# Patient Record
Sex: MALE | Race: WHITE | NOT HISPANIC OR LATINO | Employment: FULL TIME | URBAN - METROPOLITAN AREA
[De-identification: names, ages, dates, MRNs, and addresses within clinical notes are randomized per-mention and may not be internally consistent; named-entity substitution may affect disease eponyms.]

---

## 2019-11-11 ENCOUNTER — OFFICE VISIT (OUTPATIENT)
Dept: URGENT CARE | Facility: CLINIC | Age: 29
End: 2019-11-11
Payer: COMMERCIAL

## 2019-11-11 VITALS
BODY MASS INDEX: 31.1 KG/M2 | DIASTOLIC BLOOD PRESSURE: 74 MMHG | OXYGEN SATURATION: 97 % | SYSTOLIC BLOOD PRESSURE: 139 MMHG | WEIGHT: 210 LBS | HEART RATE: 84 BPM | RESPIRATION RATE: 14 BRPM | TEMPERATURE: 98.2 F | HEIGHT: 69 IN

## 2019-11-11 DIAGNOSIS — J06.9 UPPER RESPIRATORY INFECTION, VIRAL: Primary | ICD-10-CM

## 2019-11-11 PROCEDURE — G0382 LEV 3 HOSP TYPE B ED VISIT: HCPCS

## 2019-11-11 RX ORDER — ALBUTEROL SULFATE 90 UG/1
AEROSOL, METERED RESPIRATORY (INHALATION)
Refills: 11 | COMMUNITY
Start: 2019-10-11

## 2019-11-11 NOTE — LETTER
November 11, 2019     Patient: Fatoumata Martinez   YOB: 1990   Date of Visit: 11/11/2019       To Whom It May Concern: It is my medical opinion that Fatoumata Martinez may return to work on 11/13/2019  If you have any questions or concerns, please don't hesitate to call           Sincerely,        BE CAM HINES    CC: Fatoumata Mahajanon

## 2019-11-11 NOTE — PATIENT INSTRUCTIONS
Upper Respiratory Infection   AMBULATORY CARE:   An upper respiratory infection  is also called a common cold  It can affect your nose, throat, ears, and sinuses  Common signs and symptoms include the following:  Cold symptoms are usually worst for the first 3 to 5 days  You may have any of the following:  · Runny or stuffy nose    · Sneezing and coughing    · Sore throat or hoarseness    · Red, watery, and sore eyes    · Fatigue     · Chills and fever    · Headache, body aches, or sore muscles  Seek care immediately if:   · You have chest pain or trouble breathing  Contact your healthcare provider if:   · You have a fever over 102ºF (39°C)  · Your sore throat gets worse or you see white or yellow spots in your throat  · Your symptoms get worse after 3 to 5 days or your cold is not better in 14 days  · You have a rash anywhere on your skin  · You have large, tender lumps in your neck  · You have thick, green or yellow drainage from your nose  · You cough up thick yellow, green, or bloody mucus  · You have vomiting for more than 24 hours and cannot keep fluids down  · You have a bad earache  · You have questions or concerns about your condition or care  Treatment for a cold: There is no cure for the common cold  Colds are caused by viruses and do not get better with antibiotics  Most people get better in 7 to 14 days  You may continue to cough for 2 to 3 weeks  The following may help decrease your symptoms:  · Decongestants  help reduce nasal congestion and help you breathe more easily  If you take decongestant pills, they may make you feel restless or not able to sleep  Do not use decongestant sprays for more than a few days  · Cough suppressants  help reduce coughing  Ask your healthcare provider which type of cough medicine is best for you  · NSAIDs , such as ibuprofen, help decrease swelling, pain, and fever   NSAIDs can cause stomach bleeding or kidney problems in certain people  If you take blood thinner medicine, always ask your healthcare provider if NSAIDs are safe for you  Always read the medicine label and follow directions  · Acetaminophen  decreases pain and fever  It is available without a doctor's order  Ask how much to take and how often to take it  Follow directions  Read the labels of all other medicines you are using to see if they also contain acetaminophen, or ask your doctor or pharmacist  Acetaminophen can cause liver damage if not taken correctly  Do not use more than 4 grams (4,000 milligrams) total of acetaminophen in one day  Manage your cold:   · Rest as much as possible  Slowly start to do more each day  · Drink more liquids as directed  Liquids will help thin and loosen mucus so you can cough it up  Liquids will also help prevent dehydration  Liquids that help prevent dehydration include water, fruit juice, and broth  Do not drink liquids that contain caffeine  Caffeine can increase your risk for dehydration  Ask your healthcare provider how much liquid to drink each day  · Soothe a sore throat  Gargle with warm salt water  This helps your sore throat feel better  Make salt water by dissolving ¼ teaspoon salt in 1 cup warm water  You may also suck on hard candy or throat lozenges  You may use a sore throat spray  · Use a humidifier or vaporizer  Use a cool mist humidifier or a vaporizer to increase air moisture in your home  This may make it easier for you to breathe and help decrease your cough  · Use saline nasal drops as directed  These help relieve congestion  · Apply petroleum-based jelly around the outside of your nostrils  This can decrease irritation from blowing your nose  · Do not smoke  Nicotine and other chemicals in cigarettes and cigars can make your symptoms worse  They can also cause infections such as bronchitis or pneumonia   Ask your healthcare provider for information if you currently smoke and need help to quit  E-cigarettes or smokeless tobacco still contain nicotine  Talk to your healthcare provider before you use these products  Prevent spreading your cold to others:   · Try to stay away from other people during the first 2 to 3 days of your cold when it is more easily spread  · Do not share food or drinks  · Do not share hand towels with household members  · Wash your hands often, especially after you blow your nose  Turn away from other people and cover your mouth and nose with a tissue when you sneeze or cough  Follow up with your healthcare provider as directed:  Write down your questions so you remember to ask them during your visits  © 2017 [facility] Information is for End User's use only and may not be sold, redistributed or otherwise used for commercial purposes  All illustrations and images included in CareNotes® are the copyrighted property of [employer]  or Matthew Fischer  The above information is an  only  It is not intended as medical advice for individual conditions or treatments  Talk to your doctor, nurse or pharmacist before following any medical regimen to see if it is safe and effective for you

## 2020-01-09 ENCOUNTER — HOSPITAL ENCOUNTER (EMERGENCY)
Facility: HOSPITAL | Age: 30
Discharge: HOME | End: 2020-01-09
Attending: EMERGENCY MEDICINE | Admitting: EMERGENCY MEDICINE
Payer: COMMERCIAL

## 2020-01-09 VITALS
DIASTOLIC BLOOD PRESSURE: 72 MMHG | SYSTOLIC BLOOD PRESSURE: 127 MMHG | TEMPERATURE: 98 F | HEART RATE: 76 BPM | RESPIRATION RATE: 16 BRPM | OXYGEN SATURATION: 96 % | WEIGHT: 212.74 LBS | HEIGHT: 69 IN | BODY MASS INDEX: 31.51 KG/M2

## 2020-01-09 DIAGNOSIS — H57.11 ACUTE PAIN IN RIGHT EYE: Primary | ICD-10-CM

## 2020-01-09 PROCEDURE — 99282 EMERGENCY DEPT VISIT SF MDM: CPT | Performed by: EMERGENCY MEDICINE

## 2020-01-09 PROCEDURE — 99283 EMERGENCY DEPT VISIT LOW MDM: CPT

## 2020-01-09 NOTE — ED PROVIDER NOTES
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 Taking? albuterol (PROVENTIL HFA,VENTOLIN HFA) 90 mcg/act inhaler   Yes No   Sig: INHALE 2 PUFFS PO Q 4 H PRN      Facility-Administered Medications: None       Past Medical History:   Diagnosis Date    Asthma        Past Surgical History:   Procedure Laterality Date    HAND SURGERY      HEMORRHOID SURGERY      MANDIBLE SURGERY      SEPTOPLASTY      TONSILLECTOMY         History reviewed  No pertinent family history  I have reviewed and agree with the history as documented  Social History     Tobacco Use    Smoking status: Never Smoker    Smokeless tobacco: Never Used   Substance Use Topics    Alcohol use: Yes     Comment: social    Drug use: Never        Review of Systems   Constitutional: Negative for fever  Eyes: Positive for pain  Negative for photophobia, discharge, redness and itching  Respiratory: Negative for chest tightness and shortness of breath  Cardiovascular: Negative for chest pain  Skin: Negative for rash     Neurological: Negative for dizziness, light-headedness and headaches  Physical Exam  Physical Exam   Constitutional: He appears well-developed  HENT:   Head: Atraumatic  Eyes: Pupils are equal, round, and reactive to light  Conjunctivae and EOM are normal  Right eye exhibits no chemosis, no discharge, no exudate and no hordeolum  No foreign body present in the right eye  Left eye exhibits no chemosis, no discharge, no exudate and no hordeolum  No foreign body present in the left eye  Right conjunctiva is not injected  Right conjunctiva has no hemorrhage  Left conjunctiva is not injected  Left conjunctiva has no hemorrhage  No scleral icterus  Right eye exhibits normal extraocular motion  Left eye exhibits normal extraocular motion  Pupils are equal    Neck: Neck supple  No tracheal deviation present  Pulmonary/Chest: Effort normal  No stridor  No respiratory distress  Musculoskeletal: He exhibits no deformity  Neurological: He is alert  He exhibits normal muscle tone  Coordination normal    Skin: Skin is warm and dry  No rash noted  No erythema  No pallor  Psychiatric: He has a normal mood and affect  Vitals reviewed        Vital Signs  ED Triage Vitals [01/09/20 0803]   Temperature Pulse Respirations Blood Pressure SpO2   98 °F (36 7 °C) 76 16 127/72 96 %      Temp Source Heart Rate Source Patient Position - Orthostatic VS BP Location FiO2 (%)   Oral -- -- -- --      Pain Score       4           Vitals:    01/09/20 0803   BP: 127/72   Pulse: 76         Visual Acuity      ED Medications  Medications - No data to display    Diagnostic Studies  Results Reviewed     None                 No orders to display              Procedures  Procedures         ED Course                               MDM  Number of Diagnoses or Management Options  Acute pain in right eye: new and requires workup  Diagnosis management comments: Unclear etiology of symptoms patient follow up outpatient ophthalmologist       Amount and/or Complexity of Data Reviewed  Review and summarize past medical records: yes          Disposition  Final diagnoses:   Acute pain in right eye     Time reflects when diagnosis was documented in both MDM as applicable and the Disposition within this note     Time User Action Codes Description Comment    1/9/2020  8:44 AM Riteshssusy Anne Add [H57 11] Acute pain in right eye       ED Disposition     ED Disposition Condition Date/Time Comment    Discharge Stable Thu Jan 9, 2020  8:44 AM Lia Mary discharge to home/self care  Follow-up Information    None         Discharge Medication List as of 1/9/2020  8:44 AM      CONTINUE these medications which have NOT CHANGED    Details   albuterol (PROVENTIL HFA,VENTOLIN HFA) 90 mcg/act inhaler INHALE 2 PUFFS PO Q 4 H PRN, Historical Med           No discharge procedures on file      ED Provider  Electronically Signed by           Manoj Reveles DO  01/09/20 5198

## 2020-03-26 ENCOUNTER — OFFICE VISIT (OUTPATIENT)
Dept: URGENT CARE | Facility: CLINIC | Age: 30
End: 2020-03-26
Payer: COMMERCIAL

## 2020-03-26 VITALS
HEART RATE: 84 BPM | OXYGEN SATURATION: 96 % | RESPIRATION RATE: 16 BRPM | DIASTOLIC BLOOD PRESSURE: 71 MMHG | TEMPERATURE: 98.3 F | HEIGHT: 69 IN | SYSTOLIC BLOOD PRESSURE: 141 MMHG | WEIGHT: 212.4 LBS | BODY MASS INDEX: 31.46 KG/M2

## 2020-03-26 DIAGNOSIS — J45.41 MODERATE PERSISTENT ASTHMA WITH ACUTE EXACERBATION: ICD-10-CM

## 2020-03-26 DIAGNOSIS — J01.10 ACUTE NON-RECURRENT FRONTAL SINUSITIS: Primary | ICD-10-CM

## 2020-03-26 PROCEDURE — G0382 LEV 3 HOSP TYPE B ED VISIT: HCPCS | Performed by: PHYSICIAN ASSISTANT

## 2020-03-26 PROCEDURE — 99283 EMERGENCY DEPT VISIT LOW MDM: CPT | Performed by: PHYSICIAN ASSISTANT

## 2020-03-26 RX ORDER — METHYLPREDNISOLONE 4 MG/1
TABLET ORAL
Qty: 1 EACH | Refills: 0 | Status: SHIPPED | OUTPATIENT
Start: 2020-03-26

## 2020-03-26 RX ORDER — MONTELUKAST SODIUM 10 MG/1
10 TABLET ORAL DAILY
COMMUNITY
Start: 2019-06-12 | End: 2020-06-11

## 2020-03-26 RX ORDER — ALBUTEROL SULFATE 90 UG/1
2 AEROSOL, METERED RESPIRATORY (INHALATION) EVERY 6 HOURS PRN
Qty: 18 G | Refills: 0 | Status: SHIPPED | OUTPATIENT
Start: 2020-03-26

## 2020-03-26 RX ORDER — ALPRAZOLAM 0.25 MG/1
0.25 TABLET ORAL
COMMUNITY
Start: 2019-06-21 | End: 2020-06-20

## 2020-03-26 RX ORDER — FEXOFENADINE HCL 180 MG/1
180 TABLET ORAL DAILY
COMMUNITY

## 2020-03-26 RX ORDER — AMOXICILLIN AND CLAVULANATE POTASSIUM 875; 125 MG/1; MG/1
1 TABLET, FILM COATED ORAL EVERY 12 HOURS SCHEDULED
Qty: 14 TABLET | Refills: 0 | Status: SHIPPED | OUTPATIENT
Start: 2020-03-26 | End: 2020-04-02

## 2020-03-26 NOTE — PROGRESS NOTES
[facility]        NAME: Barb Cabral is a 26 y o  male  : 1990    MRN: [id number]  DATE: 2020  TIME: 6:41 PM    Assessment and Plan   Acute non-recurrent frontal sinusitis [J01 10]  1  Acute non-recurrent frontal sinusitis  amoxicillin-clavulanate (AUGMENTIN) 875-125 mg per tablet   2  Moderate persistent asthma with acute exacerbation  methylPREDNISolone 4 MG tablet therapy pack    amoxicillin-clavulanate (AUGMENTIN) 875-125 mg per tablet    albuterol (Ventolin HFA) 90 mcg/act inhaler         Patient Instructions   Prescriptions sent to pharmacy Augmentin and Medrol Dosepak-use as directed  Refill provided inhaler  Saline nasal spray several daily, Flonase in a m , decongestant mist humidifier,/ibuprofen as needed for fever or pain  Follow up with PCP in 3-5 days  Proceed to  ER if symptoms worsen  Chief Complaint     Chief Complaint   Patient presents with    Sinusitis     Pt reports sinus congestion with pressure in his head and sore throat  History of Present Illness   The patient is a 25-year-old male who presents with worsening cold an asthma symptoms that have been present for approximately 5 days  He states that he does have a history of asthma  He has had an increased cough and wheezing over the past few days  He states that he has been using his inhaler daily  Negative fever or chills  Positive nasal and sinus congestion  Frontal sinus pain and pressure  Negative dizziness  Negative ear pain or drainage  Negative abdominal pain, nausea, vomiting or diarrhea  Negative myalgias  He is currently taking Allegra D  HPI    Review of Systems   Review of Systems   Constitutional: Negative for activity change, chills, fatigue and fever  HENT: Positive for congestion, postnasal drip, rhinorrhea, sinus pressure and sinus pain  Negative for ear discharge, ear pain, facial swelling, mouth sores, sneezing, sore throat and trouble swallowing      Eyes: Negative for pain, discharge, redness and itching  Respiratory: Positive for wheezing  Negative for apnea, cough, chest tightness, shortness of breath and stridor  Cardiovascular: Negative for chest pain  Gastrointestinal: Negative for abdominal distention, abdominal pain, diarrhea, nausea and vomiting  Genitourinary: Negative for difficulty urinating  Musculoskeletal: Negative for arthralgias and myalgias  Skin: Negative for pallor and rash  Allergic/Immunologic: Negative  Neurological: Negative for dizziness, light-headedness and headaches  Hematological: Negative  Psychiatric/Behavioral: Negative  All other systems reviewed and are negative          Current Medications       Current Outpatient Medications:     albuterol (PROVENTIL HFA,VENTOLIN HFA) 90 mcg/act inhaler, INHALE 2 PUFFS PO Q 4 H PRN, Disp: , Rfl: 11    ALPRAZolam (XANAX) 0 25 mg tablet, Take 0 25 mg by mouth, Disp: , Rfl:     fexofenadine (ALLEGRA) 180 MG tablet, Take 180 mg by mouth daily, Disp: , Rfl:     montelukast (SINGULAIR) 10 mg tablet, Take 10 mg by mouth daily, Disp: , Rfl:     Multiple Vitamins-Minerals (MULTIVITAMIN ADULT EXTRA C PO), Take 1 tablet by mouth daily, Disp: , Rfl:     albuterol (Ventolin HFA) 90 mcg/act inhaler, Inhale 2 puffs every 6 (six) hours as needed for wheezing, Disp: 18 g, Rfl: 0    amoxicillin-clavulanate (AUGMENTIN) 875-125 mg per tablet, Take 1 tablet by mouth every 12 (twelve) hours for 7 days, Disp: 14 tablet, Rfl: 0    methylPREDNISolone 4 MG tablet therapy pack, Use as directed on package, Disp: 1 each, Rfl: 0    Current Allergies     Allergies as of 03/26/2020    (No Known Allergies)            The following portions of the patient's history were reviewed and updated as appropriate: allergies, current medications, past family history, past medical history, past social history, past surgical history and problem list      Past Medical History:   Diagnosis Date    Asthma Past Surgical History:   Procedure Laterality Date    HAND SURGERY      HEMORRHOID SURGERY      MANDIBLE SURGERY      SEPTOPLASTY      TONSILLECTOMY         No family history on file  Medications have been verified  Objective   /71   Pulse 84   Temp 98 3 °F (36 8 °C)   Resp 16   Ht 5' 9" (1 753 m)   Wt 96 3 kg (212 lb 6 4 oz)   SpO2 96%   BMI 31 37 kg/m²        Physical Exam     Physical Exam   Constitutional: He is oriented to person, place, and time  Vital signs are normal  He appears well-developed and well-nourished  Non-toxic appearance  He does not have a sickly appearance  He does not appear ill  No distress  HENT:   Head: Normocephalic and atraumatic  Right Ear: Hearing, tympanic membrane, external ear and ear canal normal  No drainage or tenderness  Tympanic membrane is not perforated, not erythematous, not retracted and not bulging  No middle ear effusion  No decreased hearing is noted  Left Ear: Hearing, tympanic membrane, external ear and ear canal normal  No drainage or tenderness  Tympanic membrane is not perforated, not erythematous, not retracted and not bulging  No middle ear effusion  No decreased hearing is noted  Nose: No mucosal edema, rhinorrhea or septal deviation  Right sinus exhibits frontal sinus tenderness  Right sinus exhibits no maxillary sinus tenderness  Left sinus exhibits frontal sinus tenderness  Left sinus exhibits no maxillary sinus tenderness  Mouth/Throat: Uvula is midline, oropharynx is clear and moist and mucous membranes are normal  No oral lesions  No dental abscesses or uvula swelling  No oropharyngeal exudate, posterior oropharyngeal edema, posterior oropharyngeal erythema or tonsillar abscesses  Eyes: Pupils are equal, round, and reactive to light  Conjunctivae and EOM are normal    Neck: Trachea normal, normal range of motion and full passive range of motion without pain  Neck supple  No JVD present   No edema and no erythema present  No thyromegaly present  Cardiovascular: Normal rate, regular rhythm, S1 normal, S2 normal, normal heart sounds, intact distal pulses and normal pulses  No murmur heard  Pulmonary/Chest: Effort normal and breath sounds normal  No accessory muscle usage or stridor  No tachypnea  No respiratory distress  He has no decreased breath sounds  He has no wheezes  He has no rhonchi  He has no rales  Abdominal: Soft  Normal appearance and bowel sounds are normal  He exhibits no distension  There is no hepatosplenomegaly  There is no tenderness  Musculoskeletal: Normal range of motion  He exhibits no edema  Neurological: He is alert and oriented to person, place, and time  Skin: Skin is warm, dry and intact  No abrasion, no lesion and no rash noted  He is not diaphoretic  No cyanosis or erythema  Nails show no clubbing  Psychiatric: He has a normal mood and affect  His speech is normal and behavior is normal    Nursing note and vitals reviewed

## 2020-03-26 NOTE — PATIENT INSTRUCTIONS
Prescriptions sent to pharmacy Augmentin and Medrol Dosepak-use as directed  Refill provided inhaler  Saline nasal spray several daily, Flonase in a m , decongestant mist humidifier,/ibuprofen as needed for fever or pain  Follow up with PCP in 3-5 days  Proceed to  ER if symptoms worsen  Rhinosinusitis   AMBULATORY CARE:   Rhinosinusitis (RS)  is inflammation of your nose and sinuses  It commonly begins as a virus, often as a common cold  Viruses usually last 7 to 10 days and do not need treatment  When the virus does not get better on its own, you may have bacterial RS  This means that bacteria have begun to grow inside your sinuses  Acute RS lasts less than 4 weeks  Chronic RS lasts 12 weeks or more  Recurrent RS is when you have 4 or more episodes of RS in one year  Your signs and symptoms  may be worse when you lie on your back or try to sleep  You may have any of the following:  · Stuffy nose and reduced sense of smell     · Runny nose with thick yellow or green mucus     · Pressure or pain on your face or a headache     · Pain in your teeth or bad breath     · Ear pain or pressure     · Fever or cough     · Tiredness  Seek care immediately if:   · You have double vision or you cannot see  · You have a stiff neck, a fever, or a bad headache  · Your eyeball bulges out or you cannot move your eye  · Your eye and eyelid are red, swollen, and painful  · You cannot open your eye  · You are more sleepy than normal, or you notice changes in your ability to think, move, or talk  · You have swelling of your forehead or scalp  Contact your healthcare provider if:   · Your symptoms are worse or do not improve after 3 to 5 days of treatment  · You have questions or concerns about your condition or care  Treatment for rhinosinusitis  may include any of the following:  · Acetaminophen  decreases pain and fever  It is available without a doctor's order   Ask how much to take and how often to take it  Follow directions  Acetaminophen can cause liver damage if not taken correctly  · NSAIDs , such as ibuprofen, help decrease swelling, pain, and fever  This medicine is available with or without a doctor's order  NSAIDs can cause stomach bleeding or kidney problems in certain people  If you take blood thinner medicine, always ask your healthcare provider if NSAIDs are safe for you  Always read the medicine label and follow directions  · Nasal steroid sprays  decrease inflammation in your nose and sinuses  · Decongestants  reduce swelling and drain mucus in the nose and sinuses  They may help you breathe easier  · Antihistamines  dry mucus in the nose and relieve sneezing  · Antibiotics  treat a bacterial infection and may be needed if your symptoms do not improve or they get worse  · Take your medicine as directed  Contact your healthcare provider if you think your medicine is not helping or if you have side effects  Tell him or her if you are allergic to any medicine  Keep a list of the medicines, vitamins, and herbs you take  Include the amounts, and when and why you take them  Bring the list or the pill bottles to follow-up visits  Carry your medicine list with you in case of an emergency  Self-care:   · Rinse your sinuses  Use a sinus rinse device to rinse your nasal passages with a saline (salt water) solution  This will help thin the mucus in your nose and rinse away pollen and dirt  It will also help reduce swelling so you can breathe normally  Ask your healthcare provider how often to do this  · Breathe in steam   Heat a bowl of water until you see steam  Lean over the bowl and make a tent over your head with a large towel  Breathe deeply for about 20 minutes  Be careful not to get too close to the steam or burn yourself  Do this 3 times a day  You can also breathe deeply when you take a hot shower  · Sleep with your head elevated    Place an extra pillow under your head before you go to sleep to help your sinuses drain  · Drink liquids as directed  Ask your healthcare provider how much liquid to drink each day and which liquids are best for you  Liquids will thin the mucus in your nose and help it drain  Avoid drinks that contain alcohol or caffeine  · Do not smoke, and avoid secondhand smoke  Nicotine and other chemicals in cigarettes and cigars can make your symptoms worse  Ask your healthcare provider for information if you currently smoke and need help to quit  E-cigarettes or smokeless tobacco still contain nicotine  Talk to your healthcare provider before you use these products  Follow up with your healthcare provider as directed: Follow up if your symptoms are worse or not better after 3 to 5 days of treatment  Write down your questions so you remember to ask them during your visits  © 2017 [facility] Information is for End User's use only and may not be sold, redistributed or otherwise used for commercial purposes  All illustrations and images included in CareNotes® are the copyrighted property of [employer]  or Matthew Fischer  The above information is an  only  It is not intended as medical advice for individual conditions or treatments  Talk to your doctor, nurse or pharmacist before following any medical regimen to see if it is safe and effective for you

## 2020-03-27 RX ORDER — ALBUTEROL SULFATE 90 UG/1
AEROSOL, METERED RESPIRATORY (INHALATION)
Qty: 54 G | OUTPATIENT
Start: 2020-03-27

## 2020-05-15 ENCOUNTER — OFFICE VISIT (OUTPATIENT)
Dept: URGENT CARE | Facility: CLINIC | Age: 30
End: 2020-05-15
Payer: COMMERCIAL

## 2020-05-15 VITALS
OXYGEN SATURATION: 96 % | WEIGHT: 213.8 LBS | SYSTOLIC BLOOD PRESSURE: 145 MMHG | HEIGHT: 69 IN | RESPIRATION RATE: 18 BRPM | TEMPERATURE: 97.8 F | BODY MASS INDEX: 31.67 KG/M2 | DIASTOLIC BLOOD PRESSURE: 64 MMHG | HEART RATE: 81 BPM

## 2020-05-15 DIAGNOSIS — J45.21 MILD INTERMITTENT ASTHMA WITH ACUTE EXACERBATION: Primary | ICD-10-CM

## 2020-05-15 PROCEDURE — G0382 LEV 3 HOSP TYPE B ED VISIT: HCPCS | Performed by: NURSE PRACTITIONER

## 2020-05-15 RX ORDER — PREDNISONE 10 MG/1
10 TABLET ORAL DAILY
Qty: 21 TABLET | Refills: 0 | Status: SHIPPED | OUTPATIENT
Start: 2020-05-15

## 2020-05-15 RX ORDER — ALBUTEROL SULFATE 90 UG/1
2 AEROSOL, METERED RESPIRATORY (INHALATION) EVERY 4 HOURS PRN
Qty: 18 G | Refills: 0 | Status: SHIPPED | OUTPATIENT
Start: 2020-05-15

## 2020-05-15 RX ORDER — MONTELUKAST SODIUM 10 MG/1
TABLET ORAL
COMMUNITY
Start: 2020-04-18

## 2022-07-15 ENCOUNTER — CONSULT (OUTPATIENT)
Dept: GASTROENTEROLOGY | Facility: CLINIC | Age: 32
End: 2022-07-15
Payer: COMMERCIAL

## 2022-07-15 ENCOUNTER — TELEPHONE (OUTPATIENT)
Dept: GASTROENTEROLOGY | Facility: CLINIC | Age: 32
End: 2022-07-15

## 2022-07-15 VITALS
SYSTOLIC BLOOD PRESSURE: 110 MMHG | BODY MASS INDEX: 30.72 KG/M2 | WEIGHT: 207.4 LBS | DIASTOLIC BLOOD PRESSURE: 71 MMHG | HEART RATE: 78 BPM | HEIGHT: 69 IN

## 2022-07-15 DIAGNOSIS — K60.2 ANAL FISSURE: ICD-10-CM

## 2022-07-15 DIAGNOSIS — K62.5 RECTAL BLEEDING: ICD-10-CM

## 2022-07-15 DIAGNOSIS — K62.89 RECTAL PAIN: Primary | ICD-10-CM

## 2022-07-15 PROCEDURE — 99204 OFFICE O/P NEW MOD 45 MIN: CPT | Performed by: INTERNAL MEDICINE

## 2022-07-15 NOTE — TELEPHONE ENCOUNTER
I called and left a message for patient letting him know that [facility] did confirm the Lidocaine/Nifedipine ointment but the compounding of the pharmacy is closed until Monday so they would be able to have it mixed and ready for him to  Monday afternoon

## 2022-07-15 NOTE — PROGRESS NOTES
[facility] - Outpatient Consultation  Rey Casas 34 y o  male MRN: [id number]  Encounter: [id number]          ASSESSMENT AND PLAN:      1  Rectal pain  34 year male, with complaint of severe rectal pain with intermittent bleeding, patient has a long history of perianal fissure and history of hemorrhoidal surgery in the past in some with [address] by colorectal surgeon, hemorrhoid surgery complicated by fissure and subsequently he was treated with lidocaine cream and stool softener but no improvement, today on digital rectal examination which was limited but there is a right posterior deep perianal fissure, patient did not allow was to touch or foot endoscopy  Will prescribe nifedipine and a lidocaine cream and will schedule for colonoscopy with Botox injection around perianal area  - Colonoscopy; Future    2  Anal fissure  Advised for continue with high-fiber diet, stool softener, patient is in lot of pain and having significant amount of bleeding, will schedule for colonoscopy with Botox injection on perianal area  - NIFEdipine 0 3%-lidocaine 5% rectal ointment; Apply 1 application topically every 4 (four) hours as needed for discomfort or pain Apply a small amount to anal fissure  Dispense: 100 g; Refill: 1  - Colonoscopy; Future    3  Rectal bleeding  Secondary to perianal fissure  - Colonoscopy;  Future    ______________________________________________________________________    HPI:  34 year male, with complaint of intermittent rectal bleeding, rectal pain and discomfort, he had a history of hemorrhoidal surgery with colorectal surgeon few years ago which was complicated by prolonged recovery, it to came 5 month to recovered and and it complicated with perianal fissure which initially was treated with topical lidocaine cream, stool softener but no improvement, he was recently seen follow-up with colorectal surgeon and they recommended Botox injection but then they told him that insurance denying Botox injection so  He is here for 2nd opinion  Patient had a history of diverticulosis, family history of diverticulitis, he had a colonoscopy in the past, history of colon polyp which was removed in the past also  He denies any abdominal pain, no heartburn, no reflux symptoms, no chronic constipation      REVIEW OF SYSTEMS:    CONSTITUTIONAL: Denies any fever, chills, rigors, and weight loss  HEENT: No earache or tinnitus  Denies hearing loss or visual disturbances  CARDIOVASCULAR: No chest pain or palpitations  RESPIRATORY: Denies any cough, hemoptysis, shortness of breath or dyspnea on exertion  GASTROINTESTINAL: As noted in the History of Present Illness  GENITOURINARY: No problems with urination  Denies any hematuria or dysuria  NEUROLOGIC: No dizziness or vertigo, denies headaches  MUSCULOSKELETAL: Denies any muscle or joint pain  SKIN: Denies skin rashes or itching  ENDOCRINE: Denies excessive thirst  Denies intolerance to heat or cold  PSYCHOSOCIAL: Denies depression or anxiety  Denies any recent memory loss         Historical Information   Past Medical History:   Diagnosis Date    Asthma      Past Surgical History:   Procedure Laterality Date    COLONOSCOPY      HAND SURGERY      HEMORRHOID SURGERY      MANDIBLE SURGERY      SEPTOPLASTY      TONSILLECTOMY       Social History   Social History     Substance and Sexual Activity   Alcohol Use Yes    Comment: social     Social History     Substance and Sexual Activity   Drug Use Yes    Types: Marijuana     Social History     Tobacco Use   Smoking Status Never Smoker   Smokeless Tobacco Never Used     Family History   Problem Relation Age of Onset    Colon cancer Maternal Grandfather     Stomach cancer Maternal Uncle     Colon cancer Paternal Uncle        Meds/Allergies       Current Outpatient Medications:     albuterol (PROVENTIL HFA,VENTOLIN HFA) 90 mcg/act inhaler    albuterol (Ventolin HFA) 90 mcg/act inhaler    albuterol (Ventolin HFA) 90 mcg/act inhaler    fexofenadine (ALLEGRA) 180 MG tablet    montelukast (SINGULAIR) 10 mg tablet    NIFEdipine 0 3%-lidocaine 5% rectal ointment    ALPRAZolam (XANAX) 0 25 mg tablet    methylPREDNISolone 4 MG tablet therapy pack    Multiple Vitamins-Minerals (MULTIVITAMIN ADULT EXTRA C PO)    predniSONE 10 mg tablet    No Known Allergies        Objective     Blood pressure 110/71, pulse 78, height 5' 9" (1 753 m), weight 94 1 kg (207 lb 6 4 oz)  Body mass index is 30 63 kg/m²  PHYSICAL EXAM:      General Appearance:   Alert, cooperative, no distress   HEENT:   Normocephalic, atraumatic, anicteric      Neck:  Supple, symmetrical, trachea midline   Lungs:   Clear to auscultation bilaterally; no rales, rhonchi or wheezing; respirations unlabored    Heart[de-identified]   Regular rate and rhythm; no murmur, rub, or gallop  Abdomen:   Soft, non-tender, non-distended; normal bowel sounds; no masses, no organomegaly    Genitalia:   Deferred    Rectal:   Right posterior perianal fissure, digital rectal exam and anoscopy examination was extremely painful so limited examination was done   Extremities:  No cyanosis, clubbing or edema    Pulses:  2+ and symmetric    Skin:  No jaundice, rashes, or lesions    Lymph nodes:  No palpable cervical lymphadenopathy        Lab Results:   No visits with results within 1 Day(s) from this visit  Latest known visit with results is:   No results found for any previous visit  Radiology Results:   No results found

## 2022-07-18 NOTE — TELEPHONE ENCOUNTER
I called again and left a message confirming with patient his procedure on 7/26 at [facility]  I also asked patient if he has received a phone call from [facility] in regards to his medication

## 2022-07-19 ENCOUNTER — TELEPHONE (OUTPATIENT)
Dept: GASTROENTEROLOGY | Facility: CLINIC | Age: 32
End: 2022-07-19

## 2022-07-19 ENCOUNTER — PREP FOR PROCEDURE (OUTPATIENT)
Dept: GASTROENTEROLOGY | Facility: CLINIC | Age: 32
End: 2022-07-19

## 2022-07-19 NOTE — TELEPHONE ENCOUNTER
Is there a way for you to add Botox to the order for pts colon? Karissa Hernandez from [facility] is asking that Botox be added to actual order  I don't see a way to add it from what I can see  Can you look to see if you can add it? Please advise when done, so I can let [facility] know  Thank you

## 2022-07-20 NOTE — TELEPHONE ENCOUNTER
Noted  I forwarded message on to Brandon Hsu and Riley Webb from [facility]  If I get feed back from them, I will forward on

## 2022-07-26 ENCOUNTER — ANESTHESIA EVENT (OUTPATIENT)
Dept: GASTROENTEROLOGY | Facility: HOSPITAL | Age: 32
End: 2022-07-26

## 2022-07-26 ENCOUNTER — HOSPITAL ENCOUNTER (OUTPATIENT)
Dept: GASTROENTEROLOGY | Facility: HOSPITAL | Age: 32
Setting detail: OUTPATIENT SURGERY
Discharge: HOME | End: 2022-07-26
Attending: INTERNAL MEDICINE
Payer: COMMERCIAL

## 2022-07-26 ENCOUNTER — ANESTHESIA (OUTPATIENT)
Dept: GASTROENTEROLOGY | Facility: HOSPITAL | Age: 32
End: 2022-07-26

## 2022-07-26 VITALS
RESPIRATION RATE: 12 BRPM | HEART RATE: 83 BPM | OXYGEN SATURATION: 100 % | BODY MASS INDEX: 29.58 KG/M2 | WEIGHT: 199.7 LBS | SYSTOLIC BLOOD PRESSURE: 126 MMHG | DIASTOLIC BLOOD PRESSURE: 69 MMHG | HEIGHT: 69 IN | TEMPERATURE: 97.7 F

## 2022-07-26 DIAGNOSIS — K60.2 ANAL FISSURE: ICD-10-CM

## 2022-07-26 DIAGNOSIS — K62.5 RECTAL BLEEDING: ICD-10-CM

## 2022-07-26 DIAGNOSIS — K62.89 RECTAL PAIN: ICD-10-CM

## 2022-07-26 PROCEDURE — 45381 COLONOSCOPY SUBMUCOUS NJX: CPT | Performed by: INTERNAL MEDICINE

## 2022-07-26 RX ORDER — PROPOFOL 10 MG/ML
INJECTION, EMULSION INTRAVENOUS AS NEEDED
Status: DISCONTINUED | OUTPATIENT
Start: 2022-07-26 | End: 2022-07-26

## 2022-07-26 RX ORDER — DIAZEPAM 5 MG/1
5 TABLET ORAL EVERY 6 HOURS PRN
COMMUNITY

## 2022-07-26 RX ORDER — SODIUM CHLORIDE, SODIUM LACTATE, POTASSIUM CHLORIDE, CALCIUM CHLORIDE 600; 310; 30; 20 MG/100ML; MG/100ML; MG/100ML; MG/100ML
INJECTION, SOLUTION INTRAVENOUS CONTINUOUS PRN
Status: DISCONTINUED | OUTPATIENT
Start: 2022-07-26 | End: 2022-07-26

## 2022-07-26 RX ORDER — LIDOCAINE HYDROCHLORIDE 10 MG/ML
INJECTION, SOLUTION EPIDURAL; INFILTRATION; INTRACAUDAL; PERINEURAL AS NEEDED
Status: DISCONTINUED | OUTPATIENT
Start: 2022-07-26 | End: 2022-07-26

## 2022-07-26 RX ADMIN — PROPOFOL 30 MG: 10 INJECTION, EMULSION INTRAVENOUS at 14:01

## 2022-07-26 RX ADMIN — PROPOFOL 30 MG: 10 INJECTION, EMULSION INTRAVENOUS at 13:55

## 2022-07-26 RX ADMIN — SODIUM CHLORIDE, SODIUM LACTATE, POTASSIUM CHLORIDE, AND CALCIUM CHLORIDE: .6; .31; .03; .02 INJECTION, SOLUTION INTRAVENOUS at 13:49

## 2022-07-26 RX ADMIN — PROPOFOL 140 MG: 10 INJECTION, EMULSION INTRAVENOUS at 13:52

## 2022-07-26 RX ADMIN — PROPOFOL 30 MG: 10 INJECTION, EMULSION INTRAVENOUS at 13:58

## 2022-07-26 RX ADMIN — LIDOCAINE HYDROCHLORIDE 50 MG: 10 INJECTION, SOLUTION EPIDURAL; INFILTRATION; INTRACAUDAL; PERINEURAL at 13:51

## 2022-07-26 RX ADMIN — PROPOFOL 30 MG: 10 INJECTION, EMULSION INTRAVENOUS at 14:04

## 2022-07-26 RX ADMIN — ONABOTULINUMTOXINA 200 UNITS: 100 INJECTION, POWDER, LYOPHILIZED, FOR SOLUTION INTRADERMAL; INTRAMUSCULAR at 14:07

## 2022-07-26 NOTE — H&P
History and Physical -  [facility]  Kristie Fuentes 34 y o  male MRN: [id number]                  HPI: Kristie Fuentes is a 34y o  year old male who presents for colonoscopy, history of rectal pain and intermittent bleeding, history of chronic perianal fissure      REVIEW OF SYSTEMS: Per the HPI, and otherwise unremarkable  Historical Information   Past Medical History:   Diagnosis Date    Asthma     Diverticulitis      Past Surgical History:   Procedure Laterality Date    COLONOSCOPY      HAND SURGERY      HEMORRHOID SURGERY      MANDIBLE SURGERY      SEPTOPLASTY      TONSILLECTOMY       Social History   Social History     Substance and Sexual Activity   Alcohol Use Yes    Comment: social     Social History     Substance and Sexual Activity   Drug Use Yes    Types: Marijuana    Comment: used 2 weeks ago     Social History     Tobacco Use   Smoking Status Never Smoker   Smokeless Tobacco Never Used     Family History   Problem Relation Age of Onset    Colon cancer Maternal Grandfather     Stomach cancer Maternal Uncle     Colon cancer Paternal Uncle        Meds/Allergies     (Not in a hospital admission)      No Known Allergies    Objective     /83   Pulse 90   Temp 97 7 °F (36 5 °C) (Temporal)   Resp (!) 11   Ht 5' 9" (1 753 m)   Wt 90 6 kg (199 lb 11 2 oz)   SpO2 97%   BMI 29 49 kg/m²       PHYSICAL EXAM    Gen: NAD  CV: RRR  CHEST: Clear  ABD: soft, NT/ND  EXT: no edema      ASSESSMENT/PLAN:  This is a 34y o  year old male here for colonoscopy, and he is stable and optimized for his procedure

## 2022-07-26 NOTE — ANESTHESIA POSTPROCEDURE EVALUATION
Post-Op Assessment Note    CV Status:  Stable    Pain management: adequate     Mental Status:  Sleepy   Hydration Status:  Euvolemic   PONV Controlled:  Controlled   Airway Patency:  Patent      Post Op Vitals Reviewed: Yes      Staff: CRNA         No complications documented      BP   110/57   Temp      Pulse  83   Resp  21   SpO2   98

## 2022-07-26 NOTE — ANESTHESIA PREPROCEDURE EVALUATION
Procedure:  COLONOSCOPY  Past Medical History:   Diagnosis Date    Asthma           Physical Exam    Airway    Mallampati score: II  TM Distance: >3 FB  Neck ROM: full     Dental   No notable dental hx     Cardiovascular  Rhythm: regular, Rate: normal,     Pulmonary  Breath sounds clear to auscultation,     Other Findings  Intercisor Distance > 3cm          Anesthesia Plan  ASA Score- 2     Anesthesia Type- IV sedation with anesthesia with ASA Monitors  Additional Monitors:   Airway Plan:     Comment: NPO appropriate  Discussed benefits/risks of monitored anesthetic care which involves providing a dynamic level of mild to deep sedation  Complications include awareness and/or airway obstruction/aspiration which may necessitate conversion to general anesthesia  All questions answered  Patient understands and wishes to proceed          Plan Factors-Exercise tolerance (METS): >4 METS  Chart reviewed  EKG reviewed  Existing labs reviewed  Induction-     Postoperative Plan- Plan for postoperative opioid use  Informed Consent- Anesthetic plan and risks discussed with patient  I personally reviewed this patient with the CRNA  Discussed and agreed on the Anesthesia Plan with the CRNA  Rodolfo Barraza

## 2024-06-10 ENCOUNTER — TELEPHONE (OUTPATIENT)
Dept: PSYCHIATRY | Facility: CLINIC | Age: 34
End: 2024-06-10

## 2024-06-10 NOTE — TELEPHONE ENCOUNTER
Patient has been added to the Medication Management wait list without a referral.    Insurance: [employer]  Insurance Type:    Commercial [x]   Medicaid []   County (if applicable)   Medicare []  Location Preference: [place]  Provider Preference: dr clement  Virtual: Yes [] No [x]  Were outside resources sent: Yes [] No [x]

## 2024-06-19 ENCOUNTER — APPOINTMENT (EMERGENCY)
Dept: RADIOLOGY | Facility: HOSPITAL | Age: 34
End: 2024-06-19
Payer: COMMERCIAL

## 2024-06-19 ENCOUNTER — HOSPITAL ENCOUNTER (EMERGENCY)
Facility: HOSPITAL | Age: 34
Discharge: HOME | End: 2024-06-20
Attending: EMERGENCY MEDICINE
Payer: COMMERCIAL

## 2024-06-19 DIAGNOSIS — R51.9 HEADACHE: Primary | ICD-10-CM

## 2024-06-19 PROCEDURE — 84443 ASSAY THYROID STIM HORMONE: CPT

## 2024-06-19 PROCEDURE — 36415 COLL VENOUS BLD VENIPUNCTURE: CPT

## 2024-06-19 PROCEDURE — 99284 EMERGENCY DEPT VISIT MOD MDM: CPT

## 2024-06-19 PROCEDURE — 84484 ASSAY OF TROPONIN QUANT: CPT

## 2024-06-19 PROCEDURE — 85025 COMPLETE CBC W/AUTO DIFF WBC: CPT

## 2024-06-19 PROCEDURE — 71045 X-RAY EXAM CHEST 1 VIEW: CPT

## 2024-06-19 PROCEDURE — 80053 COMPREHEN METABOLIC PANEL: CPT

## 2024-06-19 RX ORDER — ACETAMINOPHEN 10 MG/ML
1000 INJECTION, SOLUTION INTRAVENOUS ONCE
Status: COMPLETED | OUTPATIENT
Start: 2024-06-19 | End: 2024-06-20

## 2024-06-19 NOTE — Clinical Note
Rajat Pillai was seen and treated in our emergency department on 6/19/2024.                Diagnosis:     Rajat  may return to work on return date.    He may return on this date: 06/22/2024         If you have any questions or concerns, please don't hesitate to call.      Carlos Shelby MD    ______________________________           _______________          _______________  Hospital Representative                              Date                                Time

## 2024-06-19 NOTE — Clinical Note
Rajat Pillai was seen and treated in our emergency department on 6/19/2024.                Diagnosis:     Rajat  may return to work on return date.    He may return on this date: 06/21/2024         If you have any questions or concerns, please don't hesitate to call.      Carlos Shelby MD    ______________________________           _______________          _______________  Hospital Representative                              Date                                Time

## 2024-06-20 ENCOUNTER — APPOINTMENT (EMERGENCY)
Dept: CT IMAGING | Facility: HOSPITAL | Age: 34
End: 2024-06-20
Payer: COMMERCIAL

## 2024-06-20 VITALS
RESPIRATION RATE: 14 BRPM | HEART RATE: 75 BPM | SYSTOLIC BLOOD PRESSURE: 107 MMHG | DIASTOLIC BLOOD PRESSURE: 58 MMHG | OXYGEN SATURATION: 99 % | TEMPERATURE: 97.9 F

## 2024-06-20 LAB
ALBUMIN SERPL BCP-MCNC: 4.4 G/DL (ref 3.5–5)
ALP SERPL-CCNC: 51 U/L (ref 34–104)
ALT SERPL W P-5'-P-CCNC: 22 U/L (ref 7–52)
ANION GAP SERPL CALCULATED.3IONS-SCNC: 7 MMOL/L (ref 4–13)
AST SERPL W P-5'-P-CCNC: 33 U/L (ref 13–39)
ATRIAL RATE: 78 BPM
BASOPHILS # BLD AUTO: 0.03 THOUSANDS/ÂΜL (ref 0–0.1)
BASOPHILS NFR BLD AUTO: 0 % (ref 0–1)
BILIRUB SERPL-MCNC: 0.45 MG/DL (ref 0.2–1)
BUN SERPL-MCNC: 21 MG/DL (ref 5–25)
CALCIUM SERPL-MCNC: 9 MG/DL (ref 8.4–10.2)
CARDIAC TROPONIN I PNL SERPL HS: <2 NG/L
CHLORIDE SERPL-SCNC: 107 MMOL/L (ref 96–108)
CO2 SERPL-SCNC: 25 MMOL/L (ref 21–32)
CREAT SERPL-MCNC: 0.88 MG/DL (ref 0.6–1.3)
EOSINOPHIL # BLD AUTO: 0.35 THOUSAND/ÂΜL (ref 0–0.61)
EOSINOPHIL NFR BLD AUTO: 5 % (ref 0–6)
ERYTHROCYTE [DISTWIDTH] IN BLOOD BY AUTOMATED COUNT: 12.7 % (ref 11.6–15.1)
GFR SERPL CREATININE-BSD FRML MDRD: 111 ML/MIN/1.73SQ M
GLUCOSE SERPL-MCNC: 104 MG/DL (ref 65–140)
HCT VFR BLD AUTO: 42.6 % (ref 36.5–49.3)
HGB BLD-MCNC: 14 G/DL (ref 12–17)
IMM GRANULOCYTES # BLD AUTO: 0.03 THOUSAND/UL (ref 0–0.2)
IMM GRANULOCYTES NFR BLD AUTO: 0 % (ref 0–2)
LYMPHOCYTES # BLD AUTO: 2.49 THOUSANDS/ÂΜL (ref 0.6–4.47)
LYMPHOCYTES NFR BLD AUTO: 34 % (ref 14–44)
MCH RBC QN AUTO: 27.5 PG (ref 26.8–34.3)
MCHC RBC AUTO-ENTMCNC: 32.9 G/DL (ref 31.4–37.4)
MCV RBC AUTO: 84 FL (ref 82–98)
MONOCYTES # BLD AUTO: 0.55 THOUSAND/ÂΜL (ref 0.17–1.22)
MONOCYTES NFR BLD AUTO: 8 % (ref 4–12)
NEUTROPHILS # BLD AUTO: 3.88 THOUSANDS/ÂΜL (ref 1.85–7.62)
NEUTS SEG NFR BLD AUTO: 53 % (ref 43–75)
NRBC BLD AUTO-RTO: 0 /100 WBCS
P AXIS: 50 DEGREES
PLATELET # BLD AUTO: 242 THOUSANDS/UL (ref 149–390)
PMV BLD AUTO: 9.9 FL (ref 8.9–12.7)
POTASSIUM SERPL-SCNC: 3.7 MMOL/L (ref 3.5–5.3)
PR INTERVAL: 140 MS
PROT SERPL-MCNC: 6.6 G/DL (ref 6.4–8.4)
QRS AXIS: 84 DEGREES
QRSD INTERVAL: 94 MS
QT INTERVAL: 374 MS
QTC INTERVAL: 426 MS
RBC # BLD AUTO: 5.09 MILLION/UL (ref 3.88–5.62)
SODIUM SERPL-SCNC: 139 MMOL/L (ref 135–147)
T WAVE AXIS: 35 DEGREES
TSH SERPL DL<=0.05 MIU/L-ACNC: 3.3 UIU/ML (ref 0.45–4.5)
VENTRICULAR RATE: 78 BPM
WBC # BLD AUTO: 7.33 THOUSAND/UL (ref 4.31–10.16)

## 2024-06-20 PROCEDURE — 96374 THER/PROPH/DIAG INJ IV PUSH: CPT

## 2024-06-20 PROCEDURE — 70496 CT ANGIOGRAPHY HEAD: CPT

## 2024-06-20 PROCEDURE — 96375 TX/PRO/DX INJ NEW DRUG ADDON: CPT

## 2024-06-20 PROCEDURE — 70498 CT ANGIOGRAPHY NECK: CPT

## 2024-06-20 PROCEDURE — 93010 ELECTROCARDIOGRAM REPORT: CPT | Performed by: INTERNAL MEDICINE

## 2024-06-20 PROCEDURE — 99285 EMERGENCY DEPT VISIT HI MDM: CPT | Performed by: EMERGENCY MEDICINE

## 2024-06-20 PROCEDURE — 93005 ELECTROCARDIOGRAM TRACING: CPT

## 2024-06-20 PROCEDURE — 96361 HYDRATE IV INFUSION ADD-ON: CPT

## 2024-06-20 RX ORDER — KETOROLAC TROMETHAMINE 30 MG/ML
30 INJECTION, SOLUTION INTRAMUSCULAR; INTRAVENOUS ONCE
Status: COMPLETED | OUTPATIENT
Start: 2024-06-20 | End: 2024-06-20

## 2024-06-20 RX ADMIN — IOHEXOL 85 ML: 350 INJECTION, SOLUTION INTRAVENOUS at 01:08

## 2024-06-20 RX ADMIN — ACETAMINOPHEN 1000 MG: 10 INJECTION INTRAVENOUS at 00:00

## 2024-06-20 RX ADMIN — SODIUM CHLORIDE 1000 ML: 0.9 INJECTION, SOLUTION INTRAVENOUS at 00:00

## 2024-06-20 RX ADMIN — KETOROLAC TROMETHAMINE 30 MG: 30 INJECTION, SOLUTION INTRAMUSCULAR; INTRAVENOUS at 03:12

## 2024-06-20 NOTE — ED PROVIDER NOTES
History  Chief Complaint   Patient presents with    Headache     Reports diagnosed right carotid aneurysm in May.   Reports intermittent tension headache for 9 days, no relief with xanax or ibuprofen.  Pt worried about aneurysm rupture.  Denies blurred vision/dizziness/CP/SOB     Patient is a 34-year-old male with recent diagnosis of right internal carotid aneurysm who presented to the ED for headache.  Patient states that he has had a severe headache for approximately 9 days.  He describes the headache as diffuse over the top of his head, no laterality.  He states that this is different from other headaches he has had and has been the most pervasive one.  Patient does not have a history of chronic headaches.  He is concerned because he thinks this may be a manifestation of his aneurysm bleeding/rupturing.  Patient states that the headache is not associated with any nausea or vomiting or any vision changes or focal neurodeficits.  He does endorse some light sensitivity.  States that he has tried taking ibuprofen without significant improvement.  In addition, patient states that over the past week he has had intermittent palpitations.  Denies any chest pain or shortness of breath.  On evaluation, patient was well-appearing and in no acute distress but did appear anxious.        Prior to Admission Medications   Prescriptions Last Dose Informant Patient Reported? Taking?   ALPRAZolam (XANAX) 0.25 mg tablet   Yes No   Sig: Take 0.25 mg by mouth   Multiple Vitamins-Minerals (MULTIVITAMIN ADULT EXTRA C PO)   Yes No   Sig: Take 1 tablet by mouth daily   Patient not taking: Reported on 7/15/2022   NIFEdipine 0.3%-lidocaine 5% rectal ointment   No No   Sig: Apply 1 application topically every 4 (four) hours as needed for discomfort or pain Apply a small amount to anal fissure   albuterol (PROVENTIL HFA,VENTOLIN HFA) 90 mcg/act inhaler   Yes No   Sig: INHALE 2 PUFFS PO Q 4 H PRN   albuterol (Ventolin HFA) 90 mcg/act inhaler    No No   Sig: Inhale 2 puffs every 6 (six) hours as needed for wheezing   albuterol (Ventolin HFA) 90 mcg/act inhaler   No No   Sig: Inhale 2 puffs every 4 (four) hours as needed for wheezing   diazepam (VALIUM) 5 mg tablet   Yes No   Sig: Take 5 mg by mouth every 6 (six) hours as needed for anxiety   fexofenadine (ALLEGRA) 180 MG tablet   Yes No   Sig: Take 180 mg by mouth daily   methylPREDNISolone 4 MG tablet therapy pack   No No   Sig: Use as directed on package   Patient not taking: No sig reported   montelukast (SINGULAIR) 10 mg tablet   Yes No   Sig: TAKE 1 TABLET BY MOUTH DAILY   predniSONE 10 mg tablet   No No   Sig: Take 1 tablet (10 mg total) by mouth daily 60mg days 1, 50mg day 2, 40mg day 3, 30mg day 4, 20 mg day 5, 10mg day 6.   Patient not taking: No sig reported      Facility-Administered Medications: None       Past Medical History:   Diagnosis Date    Asthma     Carotid aneurysm, right (HCC)     Diverticulitis        Past Surgical History:   Procedure Laterality Date    COLONOSCOPY      HAND SURGERY      HEMORRHOID SURGERY      MANDIBLE SURGERY      SEPTOPLASTY      TONSILLECTOMY         Family History   Problem Relation Age of Onset    Colon cancer Maternal Grandfather     Stomach cancer Maternal Uncle     Colon cancer Paternal Uncle      I have reviewed and agree with the history as documented.    E-Cigarette/Vaping     E-Cigarette/Vaping Substances    Nicotine No     THC No     CBD No     Flavoring No     Other No     Unknown No      Social History     Tobacco Use    Smoking status: Never    Smokeless tobacco: Never   Substance Use Topics    Alcohol use: Yes     Comment: social    Drug use: Yes     Types: Marijuana     Comment: used 2 weeks ago        Review of Systems   Constitutional:  Negative for chills, fatigue and fever.   HENT: Negative.     Respiratory:  Negative for cough and shortness of breath.    Cardiovascular:  Positive for palpitations. Negative for chest pain.   Gastrointestinal:   Negative for abdominal pain, nausea and vomiting.   Genitourinary:  Negative for dysuria, frequency and urgency.   Musculoskeletal: Negative.    Skin: Negative.    Neurological:  Positive for headaches. Negative for dizziness, syncope and light-headedness.   Psychiatric/Behavioral:  The patient is nervous/anxious.    All other systems reviewed and are negative.      Physical Exam  ED Triage Vitals   Temperature Pulse Respirations Blood Pressure SpO2   06/19/24 2216 06/19/24 2216 06/19/24 2216 06/19/24 2216 06/19/24 2216   97.9 °F (36.6 °C) 82 20 138/71 99 %      Temp Source Heart Rate Source Patient Position - Orthostatic VS BP Location FiO2 (%)   06/19/24 2216 06/19/24 2216 06/19/24 2245 06/19/24 2216 --   Oral Monitor Sitting Right arm       Pain Score       06/20/24 0001       8             Orthostatic Vital Signs  Vitals:    06/19/24 2216 06/19/24 2245 06/20/24 0001 06/20/24 0312   BP: 138/71 129/58 112/57 107/58   Pulse: 82 80 75 75   Patient Position - Orthostatic VS:  Sitting Sitting        Physical Exam  Vitals and nursing note reviewed.   Constitutional:       Appearance: Normal appearance.   HENT:      Head: Normocephalic and atraumatic.      Nose: Nose normal.      Mouth/Throat:      Mouth: Mucous membranes are moist.      Pharynx: Oropharynx is clear.   Eyes:      Extraocular Movements: Extraocular movements intact.      Conjunctiva/sclera: Conjunctivae normal.      Pupils: Pupils are equal, round, and reactive to light.   Cardiovascular:      Rate and Rhythm: Normal rate and regular rhythm.      Pulses: Normal pulses.      Heart sounds: Normal heart sounds.   Pulmonary:      Effort: Pulmonary effort is normal.      Breath sounds: Normal breath sounds.   Abdominal:      General: Abdomen is flat. Bowel sounds are normal.      Palpations: Abdomen is soft.   Skin:     General: Skin is warm and dry.      Capillary Refill: Capillary refill takes less than 2 seconds.   Neurological:      General: No focal  deficit present.      Mental Status: He is alert and oriented to person, place, and time.      Comments: Face symmetric, tongue midline, 5/5 strength in the proximal and distal upper and lower extremities bilaterally with intact sensation to light touch throughout.  CN II-XII intact. Normal finger-to-nose, normal speech.   Psychiatric:         Mood and Affect: Mood normal.         Behavior: Behavior normal.         Thought Content: Thought content normal.         ED Medications  Medications   sodium chloride 0.9 % bolus 1,000 mL (0 mL Intravenous Stopped 6/20/24 0305)   acetaminophen (Ofirmev) injection 1,000 mg (0 mg Intravenous Stopped 6/20/24 0015)   iohexol (OMNIPAQUE) 350 MG/ML injection (MULTI-DOSE) 85 mL (85 mL Intravenous Given 6/20/24 0108)   ketorolac (TORADOL) injection 30 mg (30 mg Intravenous Given 6/20/24 0312)       Diagnostic Studies  Results Reviewed       Procedure Component Value Units Date/Time    TSH, 3rd generation with Free T4 reflex [[id number]]  (Normal) Collected: 06/19/24 2359    Lab Status: Final result Specimen: Blood from Arm, Left Updated: 06/20/24 0052     TSH 3RD GENERATON 3.303 uIU/mL     HS Troponin 0hr (reflex protocol) [[id number]]  (Normal) Collected: 06/19/24 2359    Lab Status: Final result Specimen: Blood from Arm, Left Updated: 06/20/24 0043     hs TnI 0hr <2 ng/L     Comprehensive metabolic panel [[id number]] Collected: 06/19/24 2359    Lab Status: Final result Specimen: Blood from Arm, Left Updated: 06/20/24 0036     Sodium 139 mmol/L      Potassium 3.7 mmol/L      Chloride 107 mmol/L      CO2 25 mmol/L      ANION GAP 7 mmol/L      BUN 21 mg/dL      Creatinine 0.88 mg/dL      Glucose 104 mg/dL      Calcium 9.0 mg/dL      AST 33 U/L      ALT 22 U/L      Alkaline Phosphatase 51 U/L      Total Protein 6.6 g/dL      Albumin 4.4 g/dL      Total Bilirubin 0.45 mg/dL      eGFR 111 ml/min/1.73sq m     Narrative:      National Kid[employer] Foundation guidelines for Chronic Kidney  Disease (CKD):     Stage 1 with normal or high GFR (GFR > 90 mL/min/1.73 square meters)    Stage 2 Mild CKD (GFR = 60-89 mL/min/1.73 square meters)    Stage 3A Moderate CKD (GFR = 45-59 mL/min/1.73 square meters)    Stage 3B Moderate CKD (GFR = 30-44 mL/min/1.73 square meters)    Stage 4 Severe CKD (GFR = 15-29 mL/min/1.73 square meters)    Stage 5 End Stage CKD (GFR <15 mL/min/1.73 square meters)  Note: GFR calculation is accurate only with a steady state creatinine    CBC and differential [[id number]] Collected: 06/19/24 8556    Lab Status: Final result Specimen: Blood from Arm, Left Updated: 06/20/24 0017     WBC 7.33 Thousand/uL      RBC 5.09 Million/uL      Hemoglobin 14.0 g/dL      Hematocrit 42.6 %      MCV 84 fL      MCH 27.5 pg      MCHC 32.9 g/dL      RDW 12.7 %      MPV 9.9 fL      Platelets 242 Thousands/uL      nRBC 0 /100 WBCs      Segmented % 53 %      Immature Grans % 0 %      Lymphocytes % 34 %      Monocytes % 8 %      Eosinophils Relative 5 %      Basophils Relative 0 %      Absolute Neutrophils 3.88 Thousands/µL      Absolute Immature Grans 0.03 Thousand/uL      Absolute Lymphocytes 2.49 Thousands/µL      Absolute Monocytes 0.55 Thousand/µL      Eosinophils Absolute 0.35 Thousand/µL      Basophils Absolute 0.03 Thousands/µL                    CTA head and neck with and without contrast   Final Result by Tavares Claire MD (06/20 0306)      No acute intracranial abnormality on noncontrast CT brain exam.      A small 2 x 2 mm focal saccular aneurysm arising from the medial wall of the right supraclinoid internal carotid artery is seen.  Diminished caliber of the basilar artery terminating as the superior cerebellar arteries noted.  Fetal origin of the    bilateral posterior cerebral arteries.      No enhancing brain mass or fluid collection. No vascular malformation. Major intracranial dural venous sinuses appear grossly patent.   No enhancing soft tissue neck mass/fluid collection or cervical  lymphadenopathy by size criteria.                     Workstation performed: [id number]         XR chest 1 view portable   ED Interpretation by Carlos Shelby MD (06/20 0519)   No acute cardiopulmonary process. Interpreted by me.            Procedures  ECG 12 Lead Documentation Only    Date/Time: 6/20/2024 1:00 AM    Performed by: Carlos Shelby MD  Authorized by: Carlos Shelby MD    Indications / Diagnosis:  Palpitations  ECG reviewed by me, the ED Provider: yes    Patient location:  ED  Previous ECG:     Comparison to cardiac monitor: Yes    Interpretation:     Interpretation: normal    Rate:     ECG rate:  78    ECG rate assessment: normal    Rhythm:     Rhythm: sinus rhythm    Ectopy:     Ectopy: none    QRS:     QRS axis:  Normal    QRS intervals:  Normal  Conduction:     Conduction: normal    ST segments:     ST segments:  Normal  T waves:     T waves: normal          ED Course  ED Course as of 06/20/24 0519   Th Jun 20, 2024   0306 CTA head and neck with and without contrast  No significant change                             SBIRT 22yo+      Flowsheet Row Most Recent Value   Initial Alcohol Screen: US AUDIT-C     1. How often do you have a drink containing alcohol? 0 Filed at: 06/19/2024 2318   2. How many drinks containing alcohol do you have on a typical day you are drinking?  0 Filed at: 06/19/2024 2318   3a. Male UNDER 65: How often do you have five or more drinks on one occasion? 0 Filed at: 06/19/2024 2318   3b. FEMALE Any Age, or MALE 65+: How often do you have 4 or more drinks on one occassion? 0 Filed at: 06/19/2024 2318   Audit-C Score 0 Filed at: 06/19/2024 2318   JEAN MARIE: How many times in the past year have you...    Used an illegal drug or used a prescription medication for non-medical reasons? Never Filed at: 06/19/2024 2318                  Medical Decision Making  Patient is a 34-year-old male with recent diagnosis of right internal carotid aneurysm who presented to the ED for headache.   Patient states that he has had a severe headache for approximately 9 days.  He describes the headache as diffuse over the top of his head, no laterality.  He states that this is different from other headaches he has had and has been the most pervasive one.  Patient does not have a history of chronic headaches.  He is concerned because he thinks this may be a manifestation of his aneurysm bleeding/rupturing.  Patient states that the headache is not associated with any nausea or vomiting or any vision changes or focal neurodeficits.  He does endorse some light sensitivity.  States that he has tried taking ibuprofen without significant improvement.  In addition, patient states that over the past week he has had intermittent palpitations.  Denies any chest pain or shortness of breath.  On evaluation, patient was well-appearing and in no acute distress but did appear anxious.    Differential diagnosis includes but is not limited to tension headache, migraine headache, aneurysmal bleed, arrhythmia/dysrhythmia, doubt ACS.  CBC, CMP, TSH, troponin grossly unremarkable.  EKG nonischemic.  CXR unremarkable.  CTA head and neck with and without contrast contrast did not show any changes to the aneurysm or bleeding or other acute findings.  Patient was counseled on this.  Palpitations may be secondary to the anxiety the patient has been endorsing.  He was given Toradol for the headache which took away his pain completely.  Patient was counseled extensively including on reasons to come back to the ER and discharged with explicit return precautions.      Amount and/or Complexity of Data Reviewed  Labs: ordered.  Radiology: ordered.    Risk  Prescription drug management.          Disposition  Final diagnoses:   Headache     Time reflects when diagnosis was documented in both MDM as applicable and the Disposition within this note       Time User Action Codes Description Comment    6/20/2024  4:19 AM Carlos Shelby Add [R51.9]  Headache           ED Disposition       ED Disposition   Discharge    Condition   Stable    Date/Time   Thu Jun 20, 2024 0419    Comment   Rajat Pillai discharge to home/self care.                   Follow-up Information       Follow up With Specialties Details Why Contact Veronika    [address] [place] [id number]  [phone]              Discharge Medication List as of 6/20/2024  4:19 AM        CONTINUE these medications which have NOT CHANGED    Details   !! albuterol (PROVENTIL HFA,VENTOLIN HFA) 90 mcg/act inhaler INHALE 2 PUFFS PO Q 4 H PRN, Historical Med      !! albuterol (Ventolin HFA) 90 mcg/act inhaler Inhale 2 puffs every 6 (six) hours as needed for wheezing, Starting Thu 3/26/2020, Normal      !! albuterol (Ventolin HFA) 90 mcg/act inhaler Inhale 2 puffs every 4 (four) hours as needed for wheezing, Starting Fri 5/15/2020, Normal      ALPRAZolam (XANAX) 0.25 mg tablet Take 0.25 mg by mouth, Starting Fri 6/21/2019, Until Sat 6/20/2020, Historical Med      diazepam (VALIUM) 5 mg tablet Take 5 mg by mouth every 6 (six) hours as needed for anxiety, Historical Med      fexofenadine (ALLEGRA) 180 MG tablet Take 180 mg by mouth daily, Historical Med      methylPREDNISolone 4 MG tablet therapy pack Use as directed on package, Normal      montelukast (SINGULAIR) 10 mg tablet TAKE 1 TABLET BY MOUTH DAILY, Historical Med      Multiple Vitamins-Minerals (MULTIVITAMIN ADULT EXTRA C PO) Take 1 tablet by mouth daily, Historical Med      NIFEdipine 0.3%-lidocaine 5% rectal ointment Apply 1 application topically every 4 (four) hours as needed for discomfort or pain Apply a small amount to anal fissure, Starting Fri 7/15/2022, Normal      predniSONE 10 mg tablet Take 1 tablet (10 mg total) by mouth daily 60mg days 1, 50mg day 2, 40mg day 3, 30mg day 4, 20 mg day 5, 10mg day 6., Starting Fri 5/15/2020, Normal       !! - Potential duplicate medications found. Please discuss with  provider.        No discharge procedures on file.    PDMP Review         Value Time User    PDMP Reviewed  Yes 6/19/2024 11:23 PM Narendra Washington MD             ED Provider  Attending physically available and evaluated Rajat Pillai. I managed the patient along with the ED Attending.    Electronically Signed by           Carlos Shelby MD  06/20/24 0519

## 2024-06-20 NOTE — DISCHARGE INSTRUCTIONS
Please come back to the ER if you have any new or worsening symptoms including worsening headache, vision changes, weakness on one side of the body, confusion.    Please follow-up with your doctors.

## 2024-06-20 NOTE — ED ATTENDING ATTESTATION
6/19/2024  I, Narendra Washington MD, saw and evaluated the patient. I have discussed the patient with the resident/non-physician practitioner and agree with the resident's/non-physician practitioner's findings, Plan of Care, and MDM as documented in the resident's/non-physician practitioner's note, except where noted. All available labs and Radiology studies were reviewed.  I was present for key portions of any procedure(s) performed by the resident/non-physician practitioner and I was immediately available to provide assistance.       At this point I agree with the current assessment done in the Emergency Department.  I have conducted an independent evaluation of this patient a history and physical is as follows: Patient is a 34 year old male with 9 days of intermittent dull headache. No trauma. No N/V. No fever. Was last seen at [facility] on 5/6/24 for cerebral aneurysm. (+) palpitations. Worried about leaking from aneurysm since patient is going to be flying on a plane this Friday. [employer] website checked on this patient and last Rx filled was on 5/15/24 for xanax for 30 day supply. NCAT. PERRL. No scleral icterus. Moist mucous membranes. Lungs clear. Heart regular without murmur. Abdomen soft and nontender. Good bowel sounds. No edema. No rash noted. No focal deficits. (+) somewhat anxious appearing. DDx including but not limited to: tension headache, cluster headache, migraine, ICH, SAH; doubt tumor, meningitis, temporal arteritis, carbon monoxide poisoning, zoster; sinusitis. DDx including but not limited to: metabolic abnormality, cardiac arrhythmia, ACS, MI,  thyroid disease, PE, anxiety, adverse reaction. Will check EKG, labs, CXR and CTA Head and neck.     ED Course         Critical Care Time  Procedures

## 2024-10-04 ENCOUNTER — TELEPHONE (OUTPATIENT)
Age: 34
End: 2024-10-04

## 2025-01-22 NOTE — PROGRESS NOTES
[address] [facility]        NAME: Cody Rowley is a 26 y o  male  : 1990    MRN: [id number]  DATE: 2019  TIME: 1:29 PM    Assessment and Plan   Upper respiratory infection, viral [J06 9]  1  Upper respiratory infection, viral           Patient Instructions       Follow up with PCP in 3-5 days  Proceed to  ER if symptoms worsen  Chief Complaint     Chief Complaint   Patient presents with    Cold Like Symptoms     Pt c/o body aches and tiredness  He is coughing with sinus congestion         History of Present Illness       Sore Throat    This is a new problem  The current episode started in the past 7 days  There has been no fever  The pain is at a severity of 4/10  Associated symptoms include congestion and coughing  He has tried NSAIDs for the symptoms  The treatment provided no relief  Review of Systems   Review of Systems   Constitutional: Negative  HENT: Positive for congestion and sore throat  Eyes: Negative  Respiratory: Positive for cough  Cardiovascular: Negative  Gastrointestinal: Negative  Allergic/Immunologic: Negative  Current Medications       Current Outpatient Medications:     albuterol (PROVENTIL HFA,VENTOLIN HFA) 90 mcg/act inhaler, INHALE 2 PUFFS PO Q 4 H PRN, Disp: , Rfl: 11    Current Allergies     Allergies as of 2019    (No Known Allergies)            The following portions of the patient's history were reviewed and updated as appropriate: allergies, current medications, past family history, past medical history, past social history, past surgical history and problem list      History reviewed  No pertinent past medical history  Past Surgical History:   Procedure Laterality Date    HAND SURGERY      MANDIBLE SURGERY      SEPTOPLASTY      TONSILLECTOMY         History reviewed  No pertinent family history  Medications have been verified          Objective   /74   Pulse 84   Temp 98 2 °F (36 8 °C)   Resp 14 Ht 5' 9" (1 753 m)   Wt 95 3 kg (210 lb)   SpO2 97%   BMI 31 01 kg/m²        Physical Exam     Physical Exam   Constitutional: He appears well-developed and well-nourished  HENT:   Head: Normocephalic  Nose: Mucosal edema and rhinorrhea present  Right sinus exhibits frontal sinus tenderness  Left sinus exhibits frontal sinus tenderness  Mouth/Throat: Uvula is midline and mucous membranes are normal  Posterior oropharyngeal erythema present  Eyes: Pupils are equal, round, and reactive to light  Neck: Normal range of motion  Neck supple  Cardiovascular: Normal rate and regular rhythm     Pulmonary/Chest: Effort normal and breath sounds normal  [Thin] : thin [Normal] : affect appropriate [de-identified] : Underweight  [de-identified] : Incision - healing well, clean dry intact, no evidence of drainage, purulence, surrounding erythema/ seroma/ hematoma/ bleeding. No recurrent of hernia with valsalva changes. [FreeTextEntry1] : deferred

## 2025-04-16 ENCOUNTER — APPOINTMENT (OUTPATIENT)
Dept: LAB | Facility: CLINIC | Age: 35
End: 2025-04-16
Attending: FAMILY MEDICINE
Payer: COMMERCIAL

## 2025-04-16 ENCOUNTER — OFFICE VISIT (OUTPATIENT)
Dept: FAMILY MEDICINE CLINIC | Facility: CLINIC | Age: 35
End: 2025-04-16
Payer: COMMERCIAL

## 2025-04-16 VITALS
TEMPERATURE: 98.2 F | DIASTOLIC BLOOD PRESSURE: 70 MMHG | SYSTOLIC BLOOD PRESSURE: 108 MMHG | HEART RATE: 74 BPM | WEIGHT: 196 LBS | OXYGEN SATURATION: 100 % | BODY MASS INDEX: 29.03 KG/M2 | HEIGHT: 69 IN

## 2025-04-16 DIAGNOSIS — F41.9 ANXIETY: ICD-10-CM

## 2025-04-16 DIAGNOSIS — R00.2 PALPITATIONS: ICD-10-CM

## 2025-04-16 DIAGNOSIS — G89.29 CHRONIC NONINTRACTABLE HEADACHE, UNSPECIFIED HEADACHE TYPE: ICD-10-CM

## 2025-04-16 DIAGNOSIS — R51.9 CHRONIC NONINTRACTABLE HEADACHE, UNSPECIFIED HEADACHE TYPE: ICD-10-CM

## 2025-04-16 DIAGNOSIS — Z13.220 SCREENING FOR LIPOID DISORDERS: ICD-10-CM

## 2025-04-16 DIAGNOSIS — L65.9 HAIR LOSS: ICD-10-CM

## 2025-04-16 DIAGNOSIS — J45.40 MODERATE PERSISTENT ASTHMA WITHOUT COMPLICATION: ICD-10-CM

## 2025-04-16 DIAGNOSIS — I72.0 CAROTID ANEURYSM, RIGHT (HCC): Primary | ICD-10-CM

## 2025-04-16 PROBLEM — I67.1 CEREBRAL ANEURYSM, NONRUPTURED: Status: ACTIVE | Noted: 2025-04-16

## 2025-04-16 LAB
ALBUMIN SERPL BCG-MCNC: 4.7 G/DL (ref 3.5–5)
ALP SERPL-CCNC: 52 U/L (ref 34–104)
ALT SERPL W P-5'-P-CCNC: 21 U/L (ref 7–52)
ANION GAP SERPL CALCULATED.3IONS-SCNC: 6 MMOL/L (ref 4–13)
AST SERPL W P-5'-P-CCNC: 32 U/L (ref 13–39)
BASOPHILS # BLD AUTO: 0.04 THOUSANDS/ÂΜL (ref 0–0.1)
BASOPHILS NFR BLD AUTO: 1 % (ref 0–1)
BILIRUB SERPL-MCNC: 0.84 MG/DL (ref 0.2–1)
BUN SERPL-MCNC: 20 MG/DL (ref 5–25)
CALCIUM SERPL-MCNC: 9.5 MG/DL (ref 8.4–10.2)
CHLORIDE SERPL-SCNC: 103 MMOL/L (ref 96–108)
CHOLEST SERPL-MCNC: 167 MG/DL (ref ?–200)
CO2 SERPL-SCNC: 29 MMOL/L (ref 21–32)
CREAT SERPL-MCNC: 0.85 MG/DL (ref 0.6–1.3)
EOSINOPHIL # BLD AUTO: 0.13 THOUSAND/ÂΜL (ref 0–0.61)
EOSINOPHIL NFR BLD AUTO: 2 % (ref 0–6)
ERYTHROCYTE [DISTWIDTH] IN BLOOD BY AUTOMATED COUNT: 12.8 % (ref 11.6–15.1)
GFR SERPL CREATININE-BSD FRML MDRD: 112 ML/MIN/1.73SQ M
GLUCOSE P FAST SERPL-MCNC: 89 MG/DL (ref 65–99)
HCT VFR BLD AUTO: 46.9 % (ref 36.5–49.3)
HDLC SERPL-MCNC: 43 MG/DL
HGB BLD-MCNC: 15.3 G/DL (ref 12–17)
IMM GRANULOCYTES # BLD AUTO: 0.01 THOUSAND/UL (ref 0–0.2)
IMM GRANULOCYTES NFR BLD AUTO: 0 % (ref 0–2)
LDLC SERPL CALC-MCNC: 94 MG/DL (ref 0–100)
LYMPHOCYTES # BLD AUTO: 1.99 THOUSANDS/ÂΜL (ref 0.6–4.47)
LYMPHOCYTES NFR BLD AUTO: 29 % (ref 14–44)
MCH RBC QN AUTO: 27.6 PG (ref 26.8–34.3)
MCHC RBC AUTO-ENTMCNC: 32.6 G/DL (ref 31.4–37.4)
MCV RBC AUTO: 85 FL (ref 82–98)
MONOCYTES # BLD AUTO: 0.48 THOUSAND/ÂΜL (ref 0.17–1.22)
MONOCYTES NFR BLD AUTO: 7 % (ref 4–12)
NEUTROPHILS # BLD AUTO: 4.14 THOUSANDS/ÂΜL (ref 1.85–7.62)
NEUTS SEG NFR BLD AUTO: 61 % (ref 43–75)
NONHDLC SERPL-MCNC: 124 MG/DL
NRBC BLD AUTO-RTO: 0 /100 WBCS
PLATELET # BLD AUTO: 249 THOUSANDS/UL (ref 149–390)
PMV BLD AUTO: 10.1 FL (ref 8.9–12.7)
POTASSIUM SERPL-SCNC: 3.9 MMOL/L (ref 3.5–5.3)
PROT SERPL-MCNC: 7.3 G/DL (ref 6.4–8.4)
RBC # BLD AUTO: 5.54 MILLION/UL (ref 3.88–5.62)
SODIUM SERPL-SCNC: 138 MMOL/L (ref 135–147)
TRIGL SERPL-MCNC: 148 MG/DL (ref ?–150)
TSH SERPL DL<=0.05 MIU/L-ACNC: 2.27 UIU/ML (ref 0.45–4.5)
WBC # BLD AUTO: 6.79 THOUSAND/UL (ref 4.31–10.16)

## 2025-04-16 PROCEDURE — 80061 LIPID PANEL: CPT

## 2025-04-16 PROCEDURE — 36415 COLL VENOUS BLD VENIPUNCTURE: CPT

## 2025-04-16 PROCEDURE — 99204 OFFICE O/P NEW MOD 45 MIN: CPT | Performed by: FAMILY MEDICINE

## 2025-04-16 PROCEDURE — 80053 COMPREHEN METABOLIC PANEL: CPT

## 2025-04-16 PROCEDURE — 85025 COMPLETE CBC W/AUTO DIFF WBC: CPT

## 2025-04-16 PROCEDURE — 84443 ASSAY THYROID STIM HORMONE: CPT

## 2025-04-16 RX ORDER — UBROGEPANT 100 MG/1
100 TABLET ORAL AS NEEDED
COMMUNITY

## 2025-04-16 RX ORDER — FLUTICASONE PROPIONATE AND SALMETEROL 250; 50 UG/1; UG/1
1 POWDER RESPIRATORY (INHALATION) 2 TIMES DAILY
COMMUNITY
Start: 2024-05-15 | End: 2025-05-15

## 2025-04-16 RX ORDER — NORTRIPTYLINE HYDROCHLORIDE 10 MG/1
10 CAPSULE ORAL
Qty: 30 CAPSULE | Refills: 1 | Status: SHIPPED | OUTPATIENT
Start: 2025-04-16

## 2025-04-16 RX ORDER — ALPRAZOLAM 0.5 MG
0.5 TABLET ORAL
COMMUNITY
Start: 2025-02-27

## 2025-04-16 NOTE — PROGRESS NOTES
Name: Rajat Pillai      : 1990      MRN: [id number]  Encounter Provider: Lea Temple DO  Encounter Date: 2025   Encounter department: [facility]  :  Assessment & Plan  Carotid aneurysm, right (HCC)  This is very small, and I agree that he doesn't need to worry about it medically--he needs to follow  F/u with NS here so we can keep his care a bit more centralized.         Chronic nonintractable headache, unspecified headache type  Start nortriptyline for prophy  Refer to neuro  F/u 1 mo    Orders:    nortriptyline (PAMELOR) 10 mg capsule; Take 1 capsule (10 mg total) by mouth daily at bedtime    Ambulatory Referral to Neurology; Future    Hair loss    Orders:    TSH, 3rd generation; Future    Testosterone, free, total; Future    Anxiety  Anxiety is playing a role in symptoms here, though it may not be responsible for all symptoms  Discussed with pt  He wants to start nortriptyline for headaches and has some things that will settle down at home with daughter's care  After these start, he'd like to reassess mood  Will follow  Orders:    Comprehensive metabolic panel; Future    CBC and differential; Future    Moderate persistent asthma without complication  Has seen pulm in the past    Continue current meds       Palpitations  EKG nml  Suspect anxiety playing a role  Check holter  Check labs  Orders:    Holter monitor; Future    POCT ECG    Screening for lipoid disorders    Orders:    Lipid panel; Future           History of Present Illness   HPI  Pt presents as new pt for physical and eval.     Preventively, pt up to date on imm's.  Has had prevnar.  Exercise has been limited recently.  Seeing dental.  Due for labs    From a problem standpoint, pt was diagnosed with 3mm R carotid aneurysm which was found incidentally as far as I can tell when he was being seen for facial droop with headache.  No true diagnosis at the time he had weakness.  MRI brain was normal.  Sx went away.  " Didn't get dx'd with cva or tia.  ? Atypical migraine?  He has seen multiple specialists and various systems and surveillance has been recommended, but he has never found someone whom he was chronically following.  Has been very worried about possibility of rupture.      Pt notes his biggest problem is chronic headaches like a band wrapped around his head.  These occur every other day.  Can last for hours when present.  Not usually associated with any other neurologic symptoms.      Pt notes occasional palps with or without headaches.  More at night.  Notes a sudden hard beat.  No chest pain, shortness of breath, lightheadedness.      Pt is clearly anxious about all.  Knows he shouldn't worry about aneurysm but can't stop.  Affecting his life.  Denies depression, hopelessness.  Notes a lot of stressors at home related to daughter's chronic seizures and fallout from same    Also notes hair loss and some weight gain      Review of Systems   Constitutional:  Positive for fatigue.   HENT: Negative.     Eyes: Negative.    Respiratory: Negative.     Cardiovascular:  Positive for palpitations. Negative for chest pain and leg swelling.   Gastrointestinal: Negative.    Endocrine: Negative.    Genitourinary: Negative.    Musculoskeletal: Negative.    Skin: Negative.    Neurological:  Positive for headaches.   Psychiatric/Behavioral:  Positive for sleep disturbance. The patient is nervous/anxious.        Objective   /70 (Patient Position: Sitting, Cuff Size: Standard)   Pulse 74   Temp 98.2 °F (36.8 °C) (Temporal)   Ht 5' 9\" (1.753 m)   Wt 88.9 kg (196 lb)   SpO2 100%   BMI 28.94 kg/m²      Physical Exam  Constitutional:       Appearance: Normal appearance.   HENT:      Head: Normocephalic and atraumatic.      Right Ear: Tympanic membrane, ear canal and external ear normal.      Left Ear: Tympanic membrane, ear canal and external ear normal.      Nose: Nose normal. No congestion.      Mouth/Throat:      Mouth: " Mucous membranes are moist.      Pharynx: No oropharyngeal exudate or posterior oropharyngeal erythema.   Eyes:      Extraocular Movements: Extraocular movements intact.      Conjunctiva/sclera: Conjunctivae normal.      Pupils: Pupils are equal, round, and reactive to light.   Neck:      Vascular: No carotid bruit.   Cardiovascular:      Rate and Rhythm: Normal rate and regular rhythm.      Heart sounds: No murmur heard.     No friction rub. No gallop.   Pulmonary:      Effort: Pulmonary effort is normal.      Breath sounds: No wheezing, rhonchi or rales.   Abdominal:      General: Abdomen is flat. There is no distension.      Palpations: Abdomen is soft.      Tenderness: There is no abdominal tenderness.   Musculoskeletal:      Cervical back: Neck supple.   Lymphadenopathy:      Cervical: No cervical adenopathy.   Neurological:      General: No focal deficit present.      Mental Status: He is alert and oriented to person, place, and time.      Cranial Nerves: No cranial nerve deficit.      Motor: No weakness.      Coordination: Romberg sign negative. Coordination normal. Finger-Nose-Finger Test normal. Rapid alternating movements normal.      Gait: Gait is intact.      Deep Tendon Reflexes: Reflexes normal.   Psychiatric:         Attention and Perception: Attention normal.         Mood and Affect: Mood is anxious and depressed.         Speech: Speech normal.         Behavior: Behavior normal.         Thought Content: Thought content normal.         Cognition and Memory: Cognition normal.         Judgment: Judgment normal.

## 2025-05-20 ENCOUNTER — TELEMEDICINE (OUTPATIENT)
Dept: FAMILY MEDICINE CLINIC | Facility: CLINIC | Age: 35
End: 2025-05-20
Payer: COMMERCIAL

## 2025-05-20 DIAGNOSIS — G89.29 CHRONIC NONINTRACTABLE HEADACHE, UNSPECIFIED HEADACHE TYPE: Primary | ICD-10-CM

## 2025-05-20 DIAGNOSIS — R51.9 CHRONIC NONINTRACTABLE HEADACHE, UNSPECIFIED HEADACHE TYPE: Primary | ICD-10-CM

## 2025-05-20 PROCEDURE — 98005 SYNCH AUDIO-VIDEO EST LOW 20: CPT | Performed by: FAMILY MEDICINE

## 2025-05-22 PROBLEM — R51.9 CHRONIC NONINTRACTABLE HEADACHE: Status: ACTIVE | Noted: 2025-05-22

## 2025-05-22 PROBLEM — G89.29 CHRONIC NONINTRACTABLE HEADACHE: Status: ACTIVE | Noted: 2025-05-22

## 2025-05-23 NOTE — PROGRESS NOTES
Virtual Regular VisitName: Rajat Pillai      : 1990      MRN: [id number]  Encounter Provider: Lea Temple DO  Encounter Date: 2025   Encounter department: [facility]  :  Assessment & Plan  Chronic nonintractable headache, unspecified headache type  Increase nortriptyline to 25mcg daily  Update me in 1 mo  Ubrelvy prn           History of Present Illness     HPI  Pt presents  for f/u headaches, mood.      Pt has been taking nortriptyline 10mg for the last month.  Notes dramatically reduced headaches to <2 per week.  Feels more relaxed.  Ubrelvy helpful when he uses.  Feels well on current dosing.  No sedation.    Review of Systems  See hpi    Objective   There were no vitals taken for this visit.    Physical Exam  Constitutional:       Appearance: Normal appearance.   HENT:      Head: Normocephalic and atraumatic.     Eyes:      Extraocular Movements: Extraocular movements intact.      Conjunctiva/sclera: Conjunctivae normal.     Pulmonary:      Effort: Pulmonary effort is normal. No respiratory distress.     Neurological:      General: No focal deficit present.      Mental Status: He is alert and oriented to person, place, and time.     Psychiatric:         Mood and Affect: Mood normal.         Behavior: Behavior normal.         Judgment: Judgment normal.         Administrative Statements   Encounter provider Lea Temple DO    The Patient is located at Home and in the following state in which I hold an active license [place].    The patient was identified by name and date of birth. Rajat Pillai was informed that this is a telemedicine visit and that the visit is being conducted through the Epic Embedded platform. He agrees to proceed..  My office door was closed. No one else was in the room.  He acknowledged consent and understanding of privacy and security of the video platform. The patient has agreed to participate and understands they can discontinue the visit at any  time.    I have spent a total time of 10 minutes in caring for this patient on the day of the visit/encounter including Risks and benefits of tx options, Instructions for management, and Impressions, not including the time spent for establishing the audio/video connection.

## 2025-06-01 DIAGNOSIS — R51.9 CHRONIC NONINTRACTABLE HEADACHE, UNSPECIFIED HEADACHE TYPE: ICD-10-CM

## 2025-06-01 DIAGNOSIS — G89.29 CHRONIC NONINTRACTABLE HEADACHE, UNSPECIFIED HEADACHE TYPE: ICD-10-CM

## 2025-06-01 RX ORDER — NORTRIPTYLINE HYDROCHLORIDE 10 MG/1
10 CAPSULE ORAL
Qty: 30 CAPSULE | Refills: 1 | Status: SHIPPED | OUTPATIENT
Start: 2025-06-01 | End: 2025-06-09

## 2025-06-04 ENCOUNTER — TELEPHONE (OUTPATIENT)
Age: 35
End: 2025-06-04

## 2025-06-06 DIAGNOSIS — R51.9 CHRONIC NONINTRACTABLE HEADACHE, UNSPECIFIED HEADACHE TYPE: ICD-10-CM

## 2025-06-06 DIAGNOSIS — G89.29 CHRONIC NONINTRACTABLE HEADACHE, UNSPECIFIED HEADACHE TYPE: ICD-10-CM

## 2025-06-06 RX ORDER — NORTRIPTYLINE HYDROCHLORIDE 10 MG/1
10 CAPSULE ORAL
Qty: 30 CAPSULE | Refills: 0 | Status: CANCELLED | OUTPATIENT
Start: 2025-06-06

## 2025-06-09 RX ORDER — NORTRIPTYLINE HYDROCHLORIDE 25 MG/1
25 CAPSULE ORAL
Qty: 30 CAPSULE | Refills: 1 | Status: SHIPPED | OUTPATIENT
Start: 2025-06-09

## 2025-06-09 NOTE — TELEPHONE ENCOUNTER
Last note does say to increase from 10 to 25 mg - new dose sent.   Check in via MyChart with Dr. Temple in 4 weeks.

## 2025-06-24 DIAGNOSIS — Z80.0 FAMILY HISTORY OF COLON CANCER: Primary | ICD-10-CM
